# Patient Record
Sex: FEMALE | Race: WHITE | NOT HISPANIC OR LATINO
[De-identification: names, ages, dates, MRNs, and addresses within clinical notes are randomized per-mention and may not be internally consistent; named-entity substitution may affect disease eponyms.]

---

## 2019-03-07 ENCOUNTER — RX RENEWAL (OUTPATIENT)
Age: 74
End: 2019-03-07

## 2019-03-07 PROBLEM — Z00.00 ENCOUNTER FOR PREVENTIVE HEALTH EXAMINATION: Status: ACTIVE | Noted: 2019-03-07

## 2019-03-08 ENCOUNTER — RX RENEWAL (OUTPATIENT)
Age: 74
End: 2019-03-08

## 2019-04-18 ENCOUNTER — APPOINTMENT (OUTPATIENT)
Dept: OTOLARYNGOLOGY | Facility: CLINIC | Age: 74
End: 2019-04-18
Payer: MEDICARE

## 2019-04-18 VITALS
WEIGHT: 153 LBS | HEIGHT: 61 IN | DIASTOLIC BLOOD PRESSURE: 82 MMHG | SYSTOLIC BLOOD PRESSURE: 130 MMHG | BODY MASS INDEX: 28.89 KG/M2 | HEART RATE: 58 BPM

## 2019-04-18 DIAGNOSIS — C80.1 MALIGNANT (PRIMARY) NEOPLASM, UNSPECIFIED: ICD-10-CM

## 2019-04-18 DIAGNOSIS — Z82.49 FAMILY HISTORY OF ISCHEMIC HEART DISEASE AND OTHER DISEASES OF THE CIRCULATORY SYSTEM: ICD-10-CM

## 2019-04-18 PROCEDURE — 99213 OFFICE O/P EST LOW 20 MIN: CPT | Mod: 25

## 2019-04-18 PROCEDURE — 31575 DIAGNOSTIC LARYNGOSCOPY: CPT

## 2019-04-18 RX ORDER — RIZATRIPTAN BENZOATE 10 MG/1
TABLET ORAL
Refills: 0 | Status: ACTIVE | COMMUNITY

## 2019-04-18 RX ORDER — DICLOFENAC SODIUM 10 MG/G
1 GEL TOPICAL
Qty: 100 | Refills: 0 | Status: ACTIVE | COMMUNITY
Start: 2019-02-14

## 2019-04-18 RX ORDER — ONDANSETRON 4 MG/1
4 TABLET ORAL
Qty: 20 | Refills: 0 | Status: ACTIVE | COMMUNITY
Start: 2018-12-20

## 2019-04-18 RX ORDER — ALBUTEROL SULFATE 90 UG/1
108 (90 BASE) AEROSOL, METERED RESPIRATORY (INHALATION)
Qty: 8 | Refills: 0 | Status: ACTIVE | COMMUNITY
Start: 2019-01-11

## 2019-04-18 RX ORDER — FAMOTIDINE 40 MG/1
40 TABLET, FILM COATED ORAL TWICE DAILY
Qty: 60 | Refills: 0 | Status: DISCONTINUED | COMMUNITY
Start: 2019-03-07 | End: 2019-04-18

## 2019-04-18 RX ORDER — FAMOTIDINE 10 MG/1
TABLET, FILM COATED ORAL
Refills: 0 | Status: ACTIVE | COMMUNITY

## 2019-04-18 NOTE — HISTORY OF PRESENT ILLNESS
[de-identified] : THEO ZAMBRANO is a 73 year woman with a history of LPR and cough who had been doing well after a course of prednisone from dr. Jimbo Galvan. She started to cough several days ago but over the weekend she was working in her garden and developed bad cough. She had some heartburn but otherwise feels well.\par

## 2019-04-18 NOTE — REVIEW OF SYSTEMS
[Hearing Loss] : hearing loss [Throat Dryness] : throat dryness [Throat Clearing] : throat clearing [Hoarseness] : hoarseness [Wheezing] : wheezing [Cough] : cough [Diarrhea] : diarrhea [Joint Pain] : joint pain [Patient Intake Form Reviewed] : Patient intake form was reviewed

## 2019-04-18 NOTE — ASSESSMENT
[FreeTextEntry1] : THEO ZAMBRANO has bad probably reflux related cough worse after possible exposure to mold in her garden, She will continue with reflux diet and use Zantac 150 mg tid. I will speak to her in 48 hours.\par

## 2019-07-18 ENCOUNTER — APPOINTMENT (OUTPATIENT)
Dept: OTOLARYNGOLOGY | Facility: CLINIC | Age: 74
End: 2019-07-18
Payer: MEDICARE

## 2019-07-18 VITALS
HEART RATE: 82 BPM | WEIGHT: 153 LBS | DIASTOLIC BLOOD PRESSURE: 82 MMHG | BODY MASS INDEX: 28.89 KG/M2 | SYSTOLIC BLOOD PRESSURE: 132 MMHG | HEIGHT: 61 IN

## 2019-07-18 DIAGNOSIS — H93.A3 PULSATILE TINNITUS, BILATERAL: ICD-10-CM

## 2019-07-18 PROCEDURE — 99213 OFFICE O/P EST LOW 20 MIN: CPT | Mod: 25

## 2019-07-18 PROCEDURE — 31575 DIAGNOSTIC LARYNGOSCOPY: CPT

## 2019-07-18 PROCEDURE — 92557 COMPREHENSIVE HEARING TEST: CPT

## 2019-07-18 PROCEDURE — 92567 TYMPANOMETRY: CPT

## 2019-07-18 NOTE — ASSESSMENT
[FreeTextEntry1] : THEO ZAMBRANO appears to have mild sudden hearing loss AS. I will start her on prednisone 30 mg daily.\par Larynx is normal.

## 2019-07-18 NOTE — HISTORY OF PRESENT ILLNESS
[de-identified] : THEO ZAMBRANO is a 74 year woman with a history of LPR, hoarseness and is doing better.\par She has had tinnitus first on the right and now the left. She doesn’t think she has hearing loss.

## 2019-07-25 ENCOUNTER — APPOINTMENT (OUTPATIENT)
Dept: OTOLARYNGOLOGY | Facility: CLINIC | Age: 74
End: 2019-07-25
Payer: MEDICARE

## 2019-07-25 VITALS
WEIGHT: 153 LBS | SYSTOLIC BLOOD PRESSURE: 131 MMHG | DIASTOLIC BLOOD PRESSURE: 86 MMHG | HEIGHT: 61 IN | HEART RATE: 89 BPM | BODY MASS INDEX: 28.89 KG/M2

## 2019-07-25 DIAGNOSIS — H93.12 TINNITUS, LEFT EAR: ICD-10-CM

## 2019-07-25 PROCEDURE — 92557 COMPREHENSIVE HEARING TEST: CPT

## 2019-07-25 PROCEDURE — 92567 TYMPANOMETRY: CPT

## 2019-07-25 PROCEDURE — 99213 OFFICE O/P EST LOW 20 MIN: CPT

## 2019-07-25 NOTE — HISTORY OF PRESENT ILLNESS
[de-identified] : THEO ZAMBRANO is a 74 year woman with a history of new tinnitus AS. She has not improved on oral prednisone.\par

## 2019-09-12 ENCOUNTER — RX RENEWAL (OUTPATIENT)
Age: 74
End: 2019-09-12

## 2019-09-13 RX ORDER — LORAZEPAM 1 MG/1
1 TABLET ORAL
Qty: 10 | Refills: 0 | Status: ACTIVE | COMMUNITY
Start: 2019-09-12 | End: 1900-01-01

## 2019-09-13 RX ORDER — ONDANSETRON 4 MG/1
4 TABLET ORAL
Qty: 10 | Refills: 0 | Status: ACTIVE | COMMUNITY
Start: 2019-09-12 | End: 1900-01-01

## 2019-10-22 ENCOUNTER — APPOINTMENT (OUTPATIENT)
Dept: OTOLARYNGOLOGY | Facility: CLINIC | Age: 74
End: 2019-10-22
Payer: MEDICARE

## 2019-10-22 VITALS
HEART RATE: 98 BPM | SYSTOLIC BLOOD PRESSURE: 124 MMHG | DIASTOLIC BLOOD PRESSURE: 91 MMHG | HEIGHT: 61 IN | WEIGHT: 153 LBS | BODY MASS INDEX: 28.89 KG/M2

## 2019-10-22 DIAGNOSIS — H90.3 SENSORINEURAL HEARING LOSS, BILATERAL: ICD-10-CM

## 2019-10-22 DIAGNOSIS — J00 ACUTE NASOPHARYNGITIS [COMMON COLD]: ICD-10-CM

## 2019-10-22 PROCEDURE — 99213 OFFICE O/P EST LOW 20 MIN: CPT | Mod: 25

## 2019-10-22 PROCEDURE — 31231 NASAL ENDOSCOPY DX: CPT

## 2019-10-22 NOTE — HISTORY OF PRESENT ILLNESS
[de-identified] : THEO ZAMBRANO is a 74 year woman with a history of recent URI with productive sometimes purulent cough for 3-4 days. She has nasal congestion and headache.

## 2019-10-22 NOTE — REVIEW OF SYSTEMS
[Seasonal Allergies] : seasonal allergies [Post Nasal Drip] : post nasal drip [Nasal Congestion] : nasal congestion [Hoarseness] : hoarseness [Throat Clearing] : throat clearing [Cough] : cough [Patient Intake Form Reviewed] : Patient intake form was reviewed

## 2019-10-25 LAB — BACTERIA SPT CULT: NORMAL

## 2019-10-30 LAB — BACTERIA FLD CULT: ABNORMAL

## 2019-12-05 ENCOUNTER — APPOINTMENT (OUTPATIENT)
Dept: OTOLARYNGOLOGY | Facility: CLINIC | Age: 74
End: 2019-12-05
Payer: MEDICARE

## 2019-12-05 VITALS
HEART RATE: 81 BPM | SYSTOLIC BLOOD PRESSURE: 131 MMHG | BODY MASS INDEX: 28.89 KG/M2 | HEIGHT: 61 IN | WEIGHT: 153 LBS | DIASTOLIC BLOOD PRESSURE: 78 MMHG

## 2019-12-05 DIAGNOSIS — G43.909 MIGRAINE, UNSPECIFIED, NOT INTRACTABLE, W/OUT STATUS MIGRAINOSUS: ICD-10-CM

## 2019-12-05 PROCEDURE — 31575 DIAGNOSTIC LARYNGOSCOPY: CPT

## 2019-12-05 PROCEDURE — 99213 OFFICE O/P EST LOW 20 MIN: CPT | Mod: 25

## 2019-12-05 NOTE — HISTORY OF PRESENT ILLNESS
[de-identified] : THEO ZAMBRANO is a 74 year woman with a history recently diagnosed Bronchiectasis (mild). She is coughing during the day. She is having heartburn. Her singing voice is poor. She started Pepcid 20 mg bid several weeks ago.

## 2020-01-07 ENCOUNTER — APPOINTMENT (OUTPATIENT)
Dept: OTOLARYNGOLOGY | Facility: CLINIC | Age: 75
End: 2020-01-07
Payer: MEDICARE

## 2020-01-07 VITALS
HEIGHT: 61 IN | WEIGHT: 153 LBS | BODY MASS INDEX: 28.89 KG/M2 | DIASTOLIC BLOOD PRESSURE: 88 MMHG | SYSTOLIC BLOOD PRESSURE: 142 MMHG | HEART RATE: 95 BPM

## 2020-01-07 PROCEDURE — 99213 OFFICE O/P EST LOW 20 MIN: CPT | Mod: 25

## 2020-01-07 PROCEDURE — 31575 DIAGNOSTIC LARYNGOSCOPY: CPT

## 2020-01-07 RX ORDER — PREDNISONE 10 MG/1
10 TABLET ORAL
Qty: 90 | Refills: 0 | Status: ACTIVE | COMMUNITY
Start: 2019-07-19

## 2020-01-07 RX ORDER — BENZONATATE 100 MG/1
100 CAPSULE ORAL
Qty: 20 | Refills: 0 | Status: ACTIVE | COMMUNITY
Start: 2019-08-29

## 2020-01-07 RX ORDER — FAMOTIDINE 10 MG/1
TABLET, FILM COATED ORAL
Refills: 0 | Status: ACTIVE | COMMUNITY

## 2020-01-07 RX ORDER — TAMOXIFEN CITRATE 10 MG/1
10 TABLET, FILM COATED ORAL
Qty: 180 | Refills: 0 | Status: ACTIVE | COMMUNITY
Start: 2018-10-18

## 2020-01-07 RX ORDER — RANITIDINE HCL 150 MG
CAPSULE ORAL
Refills: 0 | Status: DISCONTINUED | COMMUNITY
End: 2020-01-07

## 2020-01-07 RX ORDER — UMECLIDINIUM 62.5 UG/1
62.5 AEROSOL, POWDER ORAL
Qty: 30 | Refills: 0 | Status: ACTIVE | COMMUNITY
Start: 2019-10-29

## 2020-01-07 NOTE — HISTORY OF PRESENT ILLNESS
[de-identified] : THEO ZAMBRANO is a 74 year woman with a history of GERD/LPR who is on reflux diet using pepcid 20 mg bid. Yesterday she started coughing. The cough is not productive.

## 2020-01-07 NOTE — CONSULT LETTER
[Dear  ___] : Dear  [unfilled], [Consult Letter:] : I had the pleasure of evaluating your patient, [unfilled]. [Please see my note below.] : Please see my note below. [Consult Closing:] : Thank you very much for allowing me to participate in the care of this patient.  If you have any questions, please do not hesitate to contact me. [Sincerely,] : Sincerely, [FreeTextEntry3] : Richy Estrella MD\par

## 2020-01-07 NOTE — REVIEW OF SYSTEMS
[Hoarseness] : hoarseness [Throat Pain] : throat pain [Patient Intake Form Reviewed] : Patient intake form was reviewed

## 2020-06-02 RX ORDER — DOXYCYCLINE HYCLATE 100 MG/1
100 CAPSULE ORAL
Qty: 20 | Refills: 1 | Status: ACTIVE | COMMUNITY
Start: 2020-06-02 | End: 1900-01-01

## 2021-06-17 RX ORDER — HYDROCODONE BITARTRATE AND HOMATROPINE METHYLBROMIDE 5; 1.5 MG/5ML; MG/5ML
5-1.5 SYRUP ORAL
Qty: 240 | Refills: 0 | Status: ACTIVE | COMMUNITY
Start: 2021-06-17 | End: 1900-01-01

## 2021-06-18 ENCOUNTER — APPOINTMENT (OUTPATIENT)
Dept: OTOLARYNGOLOGY | Facility: CLINIC | Age: 76
End: 2021-06-18
Payer: MEDICARE

## 2021-06-18 DIAGNOSIS — J06.9 ACUTE UPPER RESPIRATORY INFECTION, UNSPECIFIED: ICD-10-CM

## 2021-06-18 PROCEDURE — 31231 NASAL ENDOSCOPY DX: CPT

## 2021-06-18 PROCEDURE — 99213 OFFICE O/P EST LOW 20 MIN: CPT | Mod: 25

## 2021-06-18 RX ORDER — RIZATRIPTAN BENZOATE 10 MG/1
10 TABLET ORAL
Qty: 12 | Refills: 3 | Status: DISCONTINUED | COMMUNITY
Start: 2019-12-05 | End: 2021-06-18

## 2021-06-18 RX ORDER — CODEINE PHOSPHATE AND GUAIFENESIN 10; 100 MG/5ML; MG/5ML
100-10 LIQUID ORAL
Qty: 2 | Refills: 1 | Status: ACTIVE | COMMUNITY
Start: 2021-06-18 | End: 1900-01-01

## 2021-06-18 NOTE — ASSESSMENT
[FreeTextEntry1] : She has symptoms of an acute upper respiratory tract infection for the past day. This is likely viral in origin. She did have some cloudy mucus from the right middle meatus which was cultured. She is not complaining of pulmonary symptoms at this time. She does suffer from reflux and she may have exacerbation from that as well. She does have reflux related laryngeal changes on flexible laryngoscopy. There was no vocal cord lesion or hemorrhage.\par \par PLAN\par \par -findings and management options discussed in detail with the patient. \par -Nasal saline rinses, nasal steroid spray (such as fluticasone), antihistamine/decongestant as needed\par -they were asked to call for the results of the culture in the next few days\par -Reflux precautions and pepcid bid. She is unable to use PPIs. \par -she was given a cough medication by Dr. Estrella to use as needed. \par -voice rest recommended. The patient was asked to avoid throat clearing, and coughing\par - avoid alcohol based mouthwashes. Saltwater gargles prn\par -she could consider a burst of prednisone depending on how she does\par -She will speak with her pulmonologist if she has any pulmonary symptoms or the cough persists\par -We'll see how she is feeling early next week when she calls for the culture results.\par -She was asked to call if she has any problems or worsening symptoms over the next couple of days.

## 2021-06-18 NOTE — HISTORY OF PRESENT ILLNESS
[de-identified] : THEO ZAMBRANO is a 76 year patient who is well known to Dr. Estrella. She has a history of reflux. Her reflux has been getting worse over the past one to 2 weeks. Yesterday, she developed a cough with hoarseness, sore throat, and occasional nausea. She had a mild headache as well. She has no dysphagia or difficulty breathing. She may have some nasal congestion but has not noticed nasal drainage. She is on Pepcid twice a day for her reflux. She has been vaccinated for COVID. She denies recent exposure. She was given a cough medication by Dr. Estrella. 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

## 2021-06-24 LAB — BACTERIA FLD CULT: NORMAL

## 2021-06-24 RX ORDER — CODEINE PHOSPHATE AND GUAIFENESIN 10; 100 MG/5ML; MG/5ML
100-10 LIQUID ORAL
Qty: 2 | Refills: 0 | Status: ACTIVE | COMMUNITY
Start: 2021-06-24 | End: 1900-01-01

## 2021-07-06 ENCOUNTER — APPOINTMENT (OUTPATIENT)
Dept: OTOLARYNGOLOGY | Facility: CLINIC | Age: 76
End: 2021-07-06
Payer: MEDICARE

## 2021-07-06 DIAGNOSIS — R05 COUGH: ICD-10-CM

## 2021-07-06 DIAGNOSIS — K21.9 GASTRO-ESOPHAGEAL REFLUX DISEASE W/OUT ESOPHAGITIS: ICD-10-CM

## 2021-07-06 PROCEDURE — 99213 OFFICE O/P EST LOW 20 MIN: CPT | Mod: 25

## 2021-07-06 PROCEDURE — 31575 DIAGNOSTIC LARYNGOSCOPY: CPT

## 2021-07-06 NOTE — HISTORY OF PRESENT ILLNESS
[de-identified] : THEO ZAMBRANO is a 76 year woman with a history of recent severe URI with cough. Cultures were normal. Her thraot is raw and she has some coughing.

## 2021-08-27 ENCOUNTER — RX RENEWAL (OUTPATIENT)
Age: 76
End: 2021-08-27

## 2021-11-30 ENCOUNTER — RX RENEWAL (OUTPATIENT)
Age: 76
End: 2021-11-30

## 2022-03-15 ENCOUNTER — RX RENEWAL (OUTPATIENT)
Age: 77
End: 2022-03-15

## 2022-03-15 RX ORDER — RIZATRIPTAN BENZOATE 10 MG/1
10 TABLET ORAL
Qty: 12 | Refills: 2 | Status: ACTIVE | COMMUNITY
Start: 2020-05-26 | End: 1900-01-01

## 2022-04-10 RX ORDER — ONDANSETRON 4 MG/1
4 TABLET, ORALLY DISINTEGRATING ORAL
Qty: 20 | Refills: 0 | Status: ACTIVE | COMMUNITY
Start: 2022-04-10 | End: 1900-01-01

## 2022-07-14 ENCOUNTER — NON-APPOINTMENT (OUTPATIENT)
Age: 77
End: 2022-07-14

## 2022-07-20 ENCOUNTER — NON-APPOINTMENT (OUTPATIENT)
Age: 77
End: 2022-07-20

## 2022-07-20 ENCOUNTER — APPOINTMENT (OUTPATIENT)
Dept: PHYSICAL MEDICINE AND REHAB | Facility: CLINIC | Age: 77
End: 2022-07-20

## 2022-07-20 VITALS — BODY MASS INDEX: 28.89 KG/M2 | WEIGHT: 153 LBS | HEIGHT: 61 IN

## 2022-07-20 PROCEDURE — 99203 OFFICE O/P NEW LOW 30 MIN: CPT

## 2022-07-20 NOTE — ASSESSMENT
[FreeTextEntry1] : Impression:\par 1. Cervical Spondylosis w/ Cervicogenic Headaches\par 2. C4/5 Spondylolisthesis\par \par Plan: The history, physical examination, and imaging were reviewed. The imaging findings and diagnosis were discussed with the patient along with treatment options including physical therapy, oral medication, interventional spine procedures, and surgery; as well as alternative therapeutics such as acupuncture and massage. The patient is interested in interventional options, we discussed Cervical MBB/RFA, which I explained is a procedure I do not perform. I provided a referral to Pain Management for the procedure. The patient expressed verbal understanding and is in agreement with the plan of care. The patient will follow up here as needed. All of the patient's questions and concerns were addressed during today's visit.\par

## 2022-07-20 NOTE — HISTORY OF PRESENT ILLNESS
[FreeTextEntry1] : Ms. THEO ZAMBRANO is a very pleasant 77 year female who comes in for evaluation of Neck Pain  that has been ongoing for 6 months without any specific injury or inciting event. Patient has tried PT, Voltaren Gel which did not provide relief. The pain is located primarily focal to the Neck right-sided intermittent and described as achy, stiff, non-radiating with associated headaches. The pain is rated as 3/10 and ranges from 1-6/10. The patient's symptoms are aggravated by lying down and alleviated by nothing . The patient works as an Psychoanalyst which consists of sitting. The patient denies any night pain, numbness/tingling, weakness, or bowel/bladder dysfunction. The patient has no other complaints at this time.\par

## 2022-07-20 NOTE — PHYSICAL EXAM
[FreeTextEntry1] : CERVICAL\par GEN: AAOx3. NAD.\par CERVICAL ROM: Flexion full/pain free. Extension, side-bending, rotation, oblique extension all full (+) axial Sx.\par PALP: No TTP midline spinous processes, paravertebral muscles, trapezius, levator scapulae or shoulder region B/L.\par INSP: Spine alignment is midline, No evidence of scoliosis.\par STRENGTH: 5/5 bilateral shoulder abductors, elbow flexors, elbow extensors, wrist extensors, hand intrinsics, long finger flexors to D3 and D5.\par TONE: Normal, No clonus.\par REFLEXES: Symmetric biceps, triceps, brachioradialis, pronator teres. Marie's (-) B/L.\par SENSATION: Grossly intact LT BUE.\par SPECIAL: Spurling's (-) B/L.

## 2022-07-20 NOTE — DATA REVIEWED
[FreeTextEntry1] : MRI [facility] 04/2022:  Reversal of cervical lordosis noted. C4-5 2-mm anterolisthesis and a posterior disc bulge with cord abutment. C5-6: Broad posterior disc bulge with cord abutment. Bilateral uncinate process hypertrophy. C6-7 Broad posterior disc bulge with cord abutment. Bilateral uncinate process hypertrophy. \par

## 2022-08-09 DIAGNOSIS — U07.1 COVID-19: ICD-10-CM

## 2022-08-09 RX ORDER — NIRMATRELVIR AND RITONAVIR 300-100 MG
20 X 150 MG & KIT ORAL
Qty: 1 | Refills: 0 | Status: ACTIVE | COMMUNITY
Start: 2022-08-09 | End: 1900-01-01

## 2022-08-19 ENCOUNTER — APPOINTMENT (OUTPATIENT)
Dept: PHYSICAL MEDICINE AND REHAB | Facility: CLINIC | Age: 77
End: 2022-08-19

## 2022-08-19 VITALS
WEIGHT: 150 LBS | BODY MASS INDEX: 28.32 KG/M2 | SYSTOLIC BLOOD PRESSURE: 156 MMHG | HEART RATE: 94 BPM | HEIGHT: 61 IN | DIASTOLIC BLOOD PRESSURE: 94 MMHG | RESPIRATION RATE: 18 BRPM

## 2022-08-19 DIAGNOSIS — M54.2 CERVICALGIA: ICD-10-CM

## 2022-08-19 DIAGNOSIS — M24.9 JOINT DERANGEMENT, UNSPECIFIED: ICD-10-CM

## 2022-08-19 DIAGNOSIS — M43.12 SPONDYLOLISTHESIS, CERVICAL REGION: ICD-10-CM

## 2022-08-19 DIAGNOSIS — G89.29 CERVICALGIA: ICD-10-CM

## 2022-08-19 DIAGNOSIS — M50.320 OTHER CERVICAL DISC DEGENERATION, MID-CERVICAL REGION, UNSPECIFIED LEVEL: ICD-10-CM

## 2022-08-19 DIAGNOSIS — M47.812 SPONDYLOSIS W/OUT MYELOPATHY OR RADICULOPATHY, CERVICAL REGION: ICD-10-CM

## 2022-08-19 PROCEDURE — 99214 OFFICE O/P EST MOD 30 MIN: CPT

## 2022-08-19 NOTE — HISTORY OF PRESENT ILLNESS
[FreeTextEntry1] : Ms. THEO ZAMBRANO is a very pleasant 77 year female seen for evaluation of neck pain that has been ongoing for years  without any specific injury or inciting event.  i have tried everything -she says she has seen a million doctors \par she saw Dr Torres who [place] MBB/RFA -she had one injection with lidocaine which did not work and she sis not go back -said she did not like the office \par \par The pain is located primarily post neck  intermittent in nature and described as stiff -[place]  . The pain is rated as 3/10 during today's visit, and ranges from 1-4/10. The patient's symptoms are aggravated by sleeping   and alleviated by nothing she has had PT in past etc . The patient denies any radiating symptoms to the upper extremities, numbness/tingling, weakness, or bowel/bladder dysfunction. The patient has no other complaints at this time.\par she works as a psychoanalyst \par she had migraine in past  and tried med botox also did " nothing for her"\par i asked her is she has seen a rheumatologist rule out RA etc auto immune she feels it is not rheumatological \par

## 2022-08-19 NOTE — ASSESSMENT
[FreeTextEntry1] : \par PLAN AND RECOMMENDATIONS :\par \par We discussed differential diagnosis and clinical impression\par discussed PT HEP chin tucks as well as med botox for pain \par she declines \par advised my colleague Dr COLEY who is excellent at cervical MBB/RFA \par \par Recommend\par .symptomatic care and support\par  medications NSAIDS as needed -  OTC fine (-personal preference )-(once or twice a day), -warned of  possible GI side effects -advised to take with meals or add over the counter Nexium, if sensitive\par \par  imaging done \par  referral to interventional pain \par  hydrotherapy /heat / cold for pain\par  continue  spinal precautions including care with bending, lifting, twisting and  carrying.\par cont  ergonomic precautions including pacing ,posture and frequent breaks while typing.\par \par  relative rest and avoidance of painful activity where possible \par  increasing activity as discussed \par nothing further to offer \par cont swimming -excellent sport \par avoid stretching she is hypermobile -risk of easy injury -focus on strengthening\par The patient had the opportunity to ask questions and all were answered to their satisfaction. We will coordinate treatment with the other members of the treatment team.\par The patient verbalized understanding of the management/plan rationale and agreed with my recommendations.\par

## 2022-08-19 NOTE — REVIEW OF SYSTEMS
[Patient Intake Form Reviewed] : Patient intake form was reviewed [Joint Pain] : joint pain [Joint Stiffness] : joint stiffness [Negative] : Heme/Lymph [Fever] : no fever [Recent Change In Weight] : ~T no recent weight change [Lower Ext Edema] : no lower extremity edema

## 2022-08-19 NOTE — REASON FOR VISIT
[Follow-Up] : a follow-up visit [Spouse] : spouse [FreeTextEntry1] : seen by my colleague referred by Dr VEGAS

## 2022-08-19 NOTE — CONSULT LETTER
[FreeTextEntry1] : Dear Dr. LEONORA HARKINS  , DR JESSICA VEGAS \par \par I had the pleasure of evaluating your patient, THEO ZAMBRANO .\par \par Thank you very much for allowing me to participate in the care of this patient. If you have any questions, please do not hesitate to contact me. \par \par Sincerely, \par Hector Bell MD \par \par ABPMR Board Certified in Physical Medicine and Rehabilitation\par Certified Fellow of AANEM (Neuromuscular and Electrodiagnostic Medicine)\par Subspecialty certified in Sports Medicine (ABPMR)\par \par  of Physical Medicine and Rehabilitation\par [employer] [facility] [employer] [facility]\par [facility]\par \par

## 2022-08-19 NOTE — PHYSICAL EXAM
[FreeTextEntry1] : PHYSICAL EXAM : OBJECTIVE \par \par GENERAL : Awake ,alert and oriented to time place and person \par HEAD : normocephalic and atraumatic \par NECK : supple ,no tracheal deviation ,no thyroid enlargement noted with swallowing\par EYES : sclera and conjunctiva normal no redness,intact extraocular movements \par ENT  : ears and nose normal in appearance -hearing adequate \par PULMONARY: effort normal. No respiratory distress. breathing regular. No wheezes \par LYMPH : No swelling in limbs, capillary return within normal range \par CVS : warm extremities,no palpitations,not short of breath, no visible jugular venous distention\par PSYCH : mood and affect normal ,good eye contact ,normal attention \par ABDOMEN : no visible distension , \par NEUROLOGICAL:cranial nerves intact,muscle tone normal,gait and balance safe except where noted below \par SKIN : warm and dry No rash detected over specific body areas examined \par MUSCULOSKELETAL: normal muscle bulk, no focal bony tenderness /posture normal except where specified below\par head forward posture \par trigger [points both traps \par hypermobile joints \par ROM c spine full and pain free \par gait NL \par No long tract signs found on clinical exam and no focal neurological deficits\par 
Hold BP meds until off pressors

## 2022-08-19 NOTE — DATA REVIEWED
[Plain X-Rays] : plain X-Rays [MRI] : MRI [FreeTextEntry1] : spondylosis cervical with cervicogenic headaches\par  c4/5 spondylolisthesis and DDD\par reversal of cervical lordosisi 2 mm anterolistheis with post disc bulge and cord abutment broad disc C5-6 \par micheline uncinate process hypertrophy

## 2022-09-21 RX ORDER — RIZATRIPTAN BENZOATE 10 MG/1
10 TABLET ORAL
Qty: 12 | Refills: 1 | Status: ACTIVE | COMMUNITY
Start: 2022-09-21 | End: 1900-01-01

## 2022-10-21 RX ORDER — GUAIFENESIN AND CODEINE PHOSPHATE 10; 100 MG/5ML; MG/5ML
100-10 SOLUTION ORAL
Qty: 180 | Refills: 0 | Status: ACTIVE | COMMUNITY
Start: 2022-10-21 | End: 1900-01-01

## 2022-10-29 RX ORDER — GUAIFENESIN AND CODEINE PHOSPHATE 10; 100 MG/5ML; MG/5ML
100-10 SOLUTION ORAL
Qty: 180 | Refills: 0 | Status: ACTIVE | COMMUNITY
Start: 2022-10-29 | End: 1900-01-01

## 2023-06-30 ENCOUNTER — RX RENEWAL (OUTPATIENT)
Age: 78
End: 2023-06-30

## 2023-08-28 ENCOUNTER — RX RENEWAL (OUTPATIENT)
Age: 78
End: 2023-08-28

## 2023-08-30 ENCOUNTER — RX RENEWAL (OUTPATIENT)
Age: 78
End: 2023-08-30

## 2023-10-12 ENCOUNTER — RX RENEWAL (OUTPATIENT)
Age: 78
End: 2023-10-12

## 2023-12-11 RX ORDER — RIZATRIPTAN BENZOATE 10 MG/1
10 TABLET ORAL
Qty: 12 | Refills: 2 | Status: ACTIVE | COMMUNITY
Start: 2022-08-08 | End: 1900-01-01

## 2024-01-17 RX ORDER — RIZATRIPTAN BENZOATE 10 MG/1
10 TABLET ORAL
Qty: 12 | Refills: 4 | Status: ACTIVE | COMMUNITY
Start: 2021-10-09 | End: 1900-01-01

## 2024-03-01 RX ORDER — PREDNISONE 20 MG/1
20 TABLET ORAL
Qty: 8 | Refills: 0 | Status: ACTIVE | COMMUNITY
Start: 2024-03-01 | End: 1900-01-01

## 2024-06-17 ENCOUNTER — APPOINTMENT (OUTPATIENT)
Dept: OTOLARYNGOLOGY | Facility: CLINIC | Age: 79
End: 2024-06-17

## 2024-10-03 DIAGNOSIS — R11.0 NAUSEA: ICD-10-CM

## 2024-10-03 RX ORDER — ONDANSETRON 4 MG/1
4 TABLET, ORALLY DISINTEGRATING ORAL
Qty: 10 | Refills: 0 | Status: ACTIVE | COMMUNITY
Start: 2024-10-03 | End: 1900-01-01